# Patient Record
Sex: MALE | Race: WHITE
[De-identification: names, ages, dates, MRNs, and addresses within clinical notes are randomized per-mention and may not be internally consistent; named-entity substitution may affect disease eponyms.]

---

## 2020-10-05 ENCOUNTER — HOSPITAL ENCOUNTER (EMERGENCY)
Dept: HOSPITAL 61 - ER | Age: 70
LOS: 1 days | Discharge: ANOTHER HOSPITAL | End: 2020-10-06
Payer: MEDICARE

## 2020-10-05 VITALS — WEIGHT: 225.53 LBS | BODY MASS INDEX: 32.29 KG/M2 | HEIGHT: 70 IN

## 2020-10-05 DIAGNOSIS — R47.01: ICD-10-CM

## 2020-10-05 DIAGNOSIS — Z98.890: ICD-10-CM

## 2020-10-05 DIAGNOSIS — Z88.8: ICD-10-CM

## 2020-10-05 DIAGNOSIS — E78.00: ICD-10-CM

## 2020-10-05 DIAGNOSIS — E11.9: ICD-10-CM

## 2020-10-05 DIAGNOSIS — I63.9: Primary | ICD-10-CM

## 2020-10-05 DIAGNOSIS — I25.10: ICD-10-CM

## 2020-10-05 LAB
ALBUMIN SERPL-MCNC: 3.3 G/DL (ref 3.4–5)
ALBUMIN/GLOB SERPL: 1 {RATIO} (ref 1–1.7)
ALP SERPL-CCNC: 102 U/L (ref 46–116)
ALT SERPL-CCNC: 26 U/L (ref 16–63)
ANION GAP SERPL CALC-SCNC: 4 MMOL/L (ref 6–14)
APTT BLD: 25 SEC (ref 24–38)
AST SERPL-CCNC: 24 U/L (ref 15–37)
BASOPHILS # BLD AUTO: 0 X10^3/UL (ref 0–0.2)
BASOPHILS NFR BLD: 0 % (ref 0–3)
BILIRUB SERPL-MCNC: 1.3 MG/DL (ref 0.2–1)
BUN SERPL-MCNC: 23 MG/DL (ref 8–26)
BUN/CREAT SERPL: 13 (ref 6–20)
CALCIUM SERPL-MCNC: 9.4 MG/DL (ref 8.5–10.1)
CHLORIDE SERPL-SCNC: 105 MMOL/L (ref 98–107)
CO2 SERPL-SCNC: 28 MMOL/L (ref 21–32)
CREAT SERPL-MCNC: 1.8 MG/DL (ref 0.7–1.3)
EOSINOPHIL NFR BLD: 0.3 X10^3/UL (ref 0–0.7)
EOSINOPHIL NFR BLD: 3 % (ref 0–3)
ERYTHROCYTE [DISTWIDTH] IN BLOOD BY AUTOMATED COUNT: 14.4 % (ref 11.5–14.5)
GFR SERPLBLD BASED ON 1.73 SQ M-ARVRAT: 37.4 ML/MIN
GLUCOSE SERPL-MCNC: 193 MG/DL (ref 70–99)
HCT VFR BLD CALC: 46.8 % (ref 39–53)
HGB BLD-MCNC: 15.7 G/DL (ref 13–17.5)
LYMPHOCYTES # BLD: 1.3 X10^3/UL (ref 1–4.8)
LYMPHOCYTES NFR BLD AUTO: 17 % (ref 24–48)
MAGNESIUM SERPL-MCNC: 2.4 MG/DL (ref 1.8–2.4)
MCH RBC QN AUTO: 30 PG (ref 25–35)
MCHC RBC AUTO-ENTMCNC: 34 G/DL (ref 31–37)
MCV RBC AUTO: 91 FL (ref 79–100)
MONO #: 0.8 X10^3/UL (ref 0–1.1)
MONOCYTES NFR BLD: 10 % (ref 0–9)
NEUT #: 5.4 X10^3/UL (ref 1.8–7.7)
NEUTROPHILS NFR BLD AUTO: 69 % (ref 31–73)
PLATELET # BLD AUTO: 128 X10^3/UL (ref 140–400)
POTASSIUM SERPL-SCNC: 3.7 MMOL/L (ref 3.5–5.1)
PROT SERPL-MCNC: 6.7 G/DL (ref 6.4–8.2)
PROTHROMBIN TIME: 12.7 SEC (ref 11.7–14)
RBC # BLD AUTO: 5.17 X10^6/UL (ref 4.3–5.7)
SODIUM SERPL-SCNC: 137 MMOL/L (ref 136–145)
WBC # BLD AUTO: 7.8 X10^3/UL (ref 4–11)

## 2020-10-05 PROCEDURE — 70450 CT HEAD/BRAIN W/O DYE: CPT

## 2020-10-05 PROCEDURE — 85025 COMPLETE CBC W/AUTO DIFF WBC: CPT

## 2020-10-05 PROCEDURE — 83735 ASSAY OF MAGNESIUM: CPT

## 2020-10-05 PROCEDURE — 70498 CT ANGIOGRAPHY NECK: CPT

## 2020-10-05 PROCEDURE — 85610 PROTHROMBIN TIME: CPT

## 2020-10-05 PROCEDURE — 84484 ASSAY OF TROPONIN QUANT: CPT

## 2020-10-05 PROCEDURE — 99291 CRITICAL CARE FIRST HOUR: CPT

## 2020-10-05 PROCEDURE — 93005 ELECTROCARDIOGRAM TRACING: CPT

## 2020-10-05 PROCEDURE — 36415 COLL VENOUS BLD VENIPUNCTURE: CPT

## 2020-10-05 PROCEDURE — 70496 CT ANGIOGRAPHY HEAD: CPT

## 2020-10-05 PROCEDURE — 80053 COMPREHEN METABOLIC PANEL: CPT

## 2020-10-05 PROCEDURE — 85730 THROMBOPLASTIN TIME PARTIAL: CPT

## 2020-10-05 NOTE — RAD
CT head without contrast

 

PQRS statement: CT scans at this facility use dose reduction including 

either automated exposure control, iterative reconstructions, and /or 

weight based radiation dosing via mA and kV modification when appropriate 

to reduce radiation dose to as low as reasonably achievable.

 

HISTORY: Slurred speech. Stroke.

 

FINDINGS: There is a wedge-shaped hypodense edema involving the cortex and

underlying white matter consistent with a right anterior cerebral artery 

territory acute ischemic infarct involving the right anterior parasagittal

frontal lobe. No hemorrhage, mass or hydrocephalus. Orbits, mastoids and 

bones are unremarkable.

 

IMPRESSION: Acute ischemic right anterior cerebral artery territory 

infarct of the right anterior parasagittal frontal lobe. No hemorrhage.

 

 

**********FOR INTERNAL CODING PURPOSES**********

 

Critical result:

 

Findings discussed with  Jo Mccall RN in the ED at 10/5/2020 11:09 

PM. Read back of report was performed. The nurse is giving the physician 

these results immediately.

 

RESULT CODE: (C)  

 

 

 

 

 

Electronically signed by: Morris Oviedo MD (10/5/2020 11:12 PM) 

[facility]

## 2020-10-06 VITALS — DIASTOLIC BLOOD PRESSURE: 55 MMHG | SYSTOLIC BLOOD PRESSURE: 118 MMHG

## 2020-10-06 NOTE — RAD
CT angiography head and neck with contrast

 

Stenosis calculations for CT, MR, and conventional angiography are based 

upon measurements of the distal ICA diameter in accordance with the NASCET

methodology. Stenosis calculations for carotid ultrasound studies are 

derived from validated velocity criteria which are known to correlate with

the NASCET methodology.

 

PQRS statement: CT scans at this facility use dose reduction including 

either automated exposure control, iterative reconstructions, and /or 

weight based radiation dosing via mA and kV modification when appropriate 

to reduce radiation dose to as low as reasonably achievable.

 

HISTORY: Stroke. Slurred speech.

 

TECHNIQUE: CT imaging of the head and neck with 3-D MIP reconstructions of

the arteries with 70 mL Omnipaque 300 intravenous contrast.

 

 

 

Neck findings: Calcified plaque aortic arch, no ostial stenosis of the 

vessels from the arch. Left vertebral artery is dominant. No plaque, 

stenosis, dissection, thrombus or occlusion the vertebral arteries.

 

Left carotid artery demonstrates minimal calcified plaque at the proximal 

cervical internal carotid artery without stenosis. There is a tortuous 

tonsillar loop of the vessel. No dissection, thrombus or occlusion.

 

Right carotid artery demonstrates mild soft plaque of the distal common 

carotid artery mild calcified plaque at the bifurcation and proximal 

internal carotid artery without significant stenosis with narrowing of no 

more than 10%. No dissection, thrombus or occlusion.

 

1.5 cm hypodense right thyroid nodule. Cervical disc osteophytes and 

uncovertebral spurs with spinal canal and neural foraminal stenoses.

 

Head findings: No significant stenosis, thrombus, occlusion or aneurysm of

the posterior circulation.

 

There is no thrombosis of the anterior cerebral arteries including the 

right anterior cerebral artery at the area of the hypodense ischemic 

infarct. There is right middle cerebral artery proximal M1 segment of 

abrupt focal occlusion with downstream reconstituted enhancement at its 

bifurcation presumably due to collaterals with filling of the M2 branch 

vessels and peripherally from collateral flow, this is age-indeterminate. 

The peripheral right middle cerebral arterial branches are smaller in 

caliber and less dense which may indicate some limited collateral flow.

 

IMPRESSION:

1. Large vessel occlusion of the right middle cerebral artery M1 segment, 

with reconstituted enhancement distally due to collateral flow as 

described above.

2. Age-indeterminate right anterior cerebral artery ischemic infarct of 

the right frontal lobe. No occlusion of the right anterior cerebral artery

evident.

 

 

**********FOR INTERNAL CODING PURPOSES**********

 

Critical result:

 

Findings discussed with  Dr. Vazquez at 10/6/2020 12:53 AM. Read back of 

report performed. The nurse is giving the treating physician these 

results.

 

RESULT CODE: (C)  

 

 

 

 

 

 

Electronically signed by: Morris Oviedo MD (10/6/2020 12:57 AM) 

[facility]

## 2020-10-06 NOTE — PHYS DOC
Past Medical History


Past Medical History:  CAD, Diabetes-Type II, High Cholesterol


Past Surgical History:  Coronary Bypass Surgery


Smoking Status:  Never Smoker


Alcohol Use:  None





General Adult


EDM:


Chief Complaint:  NEURO SYMPTOMS/DEFICITS





HPI:


HPI:


Patient is a 70 year old male who presents to the Emergency Room complaining of 

aphasia. Patient unable to provide a good history. Family states started this 

afternoon. He did have similar symptoms on Saturday that resolved.





Review of Systems:


Review of Systems:


General: Denies fever, chills, sweats, fatigue


Eyes: Denies drainage, blurred vision, eye redness


HENT: Denies rhinorrhea, sore throat, earache


Respiratory: Denies cough, shortness of breath, wheezing


Cardiac: Denies edema, palpitations, chest pain


GI: Denies abdominal pain, Nausea, vomiting


MSK: Denies back pain, neck pain


Skin: Denies rash, jaundice


Neuro: Denies headache, dizziness


Psychiatric: Denies SI/HI





Heart Score:


Risk Factors:


Risk Factors:  DM, Current or recent (<one month) smoker, HTN, HLP, family 

history of CAD, obesity.


Risk Scores:


Score 0 - 3:  2.5% MACE over next 6 weeks - Discharge Home


Score 4 - 6:  20.3% MACE over next 6 weeks - Admit for Clinical Observation


Score 7 - 10:  72.7% MACE over next 6 weeks - Early Invasive Strategies





Current Medications:





Current Medications








 Medications


  (Trade)  Dose


 Ordered  Sig/Jen  Start Time


 Stop Time Status Last Admin


Dose Admin


 


 Info


  (CONTRAST GIVEN


 -- Rx MONITORING)  1 each  PRN DAILY  PRN  10/6/20 00:00


 10/8/20 00:00   





 


 Iohexol


  (Omnipaque 300


 Mg/ml)  60 ml  1X  ONCE  10/6/20 00:00


 10/6/20 00:01 DC 10/6/20 00:26


60 ML











Allergies:


Allergies:





Allergies








Coded Allergies Type Severity Reaction Last Updated Verified


 


  Opioids - Morphine Analogues Allergy Unknown  10/5/20 Yes











Physical Exam:


PE:


General: Awake, alert, NAD. Well Nourished, well hydrated. Cooperative


HEENT: Atraumatic, EOMI, PERRL, airway patent, moist oral mucosa


Neck: Supple, trachea midline


Respiratory: CTA bilaterally, normal effort, no wheezing/crackles


CV: RRR, no murmur, cap refill <2


GI: Soft, nondistended, nontender, no masses


MSK: No obvious deformities


Skin: Warm, dry, intact


Neuro: Aphasia, 5/5 strength in BUE/BLE distally and proximally, CN 2-12 intact,

cerebellar testing normal, normal gait


Psych: Normal affect, normal mood, not suicidal or homicidal





Current Patient Data:


Labs:





                                Laboratory Tests








Test


 10/5/20


23:15


 


White Blood Count


 7.8 x10^3/uL


(4.0-11.0)


 


Red Blood Count


 5.17 x10^6/uL


(4.30-5.70)


 


Hemoglobin


 15.7 g/dL


(13.0-17.5)


 


Hematocrit


 46.8 %


(39.0-53.0)


 


Mean Corpuscular Volume


 91 fL ()





 


Mean Corpuscular Hemoglobin 30 pg (25-35)  


 


Mean Corpuscular Hemoglobin


Concent 34 g/dL


(31-37)


 


Red Cell Distribution Width


 14.4 %


(11.5-14.5)


 


Platelet Count


 128 x10^3/uL


(140-400)  L


 


Neutrophils (%) (Auto) 69 % (31-73)  


 


Lymphocytes (%) (Auto) 17 % (24-48)  L


 


Monocytes (%) (Auto) 10 % (0-9)  H


 


Eosinophils (%) (Auto) 3 % (0-3)  


 


Basophils (%) (Auto) 0 % (0-3)  


 


Neutrophils # (Auto)


 5.4 x10^3/uL


(1.8-7.7)


 


Lymphocytes # (Auto)


 1.3 x10^3/uL


(1.0-4.8)


 


Monocytes # (Auto)


 0.8 x10^3/uL


(0.0-1.1)


 


Eosinophils # (Auto)


 0.3 x10^3/uL


(0.0-0.7)


 


Basophils # (Auto)


 0.0 x10^3/uL


(0.0-0.2)


 


Prothrombin Time


 12.7 SEC


(11.7-14.0)


 


Prothrombin Time INR 1.0 (0.8-1.1)  


 


Activated Partial


Thromboplast Time 25 SEC (24-38)





 


Sodium Level


 137 mmol/L


(136-145)


 


Potassium Level


 3.7 mmol/L


(3.5-5.1)


 


Chloride Level


 105 mmol/L


()


 


Carbon Dioxide Level


 28 mmol/L


(21-32)


 


Anion Gap 4 (6-14)  L


 


Blood Urea Nitrogen


 23 mg/dL


(8-26)


 


Creatinine


 1.8 mg/dL


(0.7-1.3)  H


 


Estimated GFR


(Cockcroft-Gault) 37.4  





 


BUN/Creatinine Ratio 13 (6-20)  


 


Glucose Level


 193 mg/dL


(70-99)  H


 


Calcium Level


 9.4 mg/dL


(8.5-10.1)


 


Magnesium Level


 2.4 mg/dL


(1.8-2.4)


 


Total Bilirubin


 1.3 mg/dL


(0.2-1.0)  H


 


Aspartate Amino Transferase


(AST) 24 U/L (15-37)





 


Alanine Aminotransferase (ALT)


 26 U/L (16-63)





 


Alkaline Phosphatase


 102 U/L


()


 


Troponin I Quantitative


 < 0.017 ng/mL


(0.000-0.055)


 


Total Protein


 6.7 g/dL


(6.4-8.2)


 


Albumin


 3.3 g/dL


(3.4-5.0)  L


 


Albumin/Globulin Ratio 1.0 (1.0-1.7)  





                                Laboratory Tests


10/5/20 23:15








                                Laboratory Tests


10/5/20 23:15








Vital Signs:





                                   Vital Signs








  Date Time  Temp Pulse Resp B/P (MAP) Pulse Ox O2 Delivery O2 Flow Rate FiO2


 


10/5/20 23:30  66 18 112/66 (81) 95 Room Air  


 


10/5/20 22:40 98.4       





 98.4       











EKG:


EKG:


[]





Radiology/Procedures:


Radiology/Procedures:


[]





Course & Med Decision Making:


Course & Med Decision Making


Pertinent Labs and Imaging studies reviewed. (See chart for details)





Patient is a 70-year-old male who presents to the emergency room with aphasia.  

He is outside the window for TPA.  Code stroke was set off and patient was taken

 to CT which shows an acute stroke.  CT angios was done which shows a large 

vessel occlusion.  I have discussed the case with ESTEVAN who accepted.





Dragon Disclaimer:


Dragon Disclaimer:


This electronic medical record was generated, in whole or in part, using a voice

 recognition dictation system.





Departure


Departure


Impression:  


   Primary Impression:  


   Stroke


   Additional Impression:  


   Aphasia


Disposition:  02 TRANSFER T-[facility]


Condition:  STABLE


Referrals:  


YVONNE OCAMPO MD (PCP)


Critical Care Time





Critical Care:


Authorized and Performed by: Clare Vazquez MD


Total critical care time: approximately 45 minutes


Due to a high probability of clinically significant, life threatening 

deterioration, the patient required my highest level of preparedness to 

intervene emergently and I personally spent this critical care time directly and

 personally managing the patient. This critical care time included obtaining a 

history; examining the patient; pulse oximetry; ventilator management if 

necessary; ordering and review of studies; arranging urgent treatment with 

development of a management plan; evaluation of patient's response to treatment;

 frequent reassessment; discussion with patient/family; and, discussions with 

other providers.


This critical care time was performed to assess and manage the high probability 

of imminent, life-threatening deterioration that could result in multi-organ 

failure. It was exclusive of separately billable procedures and treating other 

patients and teaching time.


Please see MDM section and the rest of the note for further information on 

patient assessment and treatment.











CLARE VAZQUEZ MD               Oct 6, 2020 01:24

## 2020-10-07 NOTE — EKG
[facility]

              [address] [place] [address]

Test Date:    2020-10-05               Test Time:    23:20:04

Pat Name:     KAMILAH GALINDO             Department:   

Patient ID:   [id number]           Room:          

Gender:       M                        Technician:   

:          [id number]               Requested By: CLARE AHUMADA

Order Number: [id number]           Reading MD:     

                                 Measurements

Intervals                              Axis          

Rate:         68                       P:            0

WA:           84                       QRS:          18

QRSD:         150                      T:            -12

QT:           462                                    

QTc:          497                                    

                           Interpretive Statements

SINUS RHYTHM

NON SPECIFIC INTRAVENTRICULAR BLOCK

QRS(T) CONTOUR ABNORMALITY

CONSISTENT WITH ANTEROLATERAL INFARCT

AGE UNDETERMINED

CONSISTENT WITH INFERIOR INFARCT

AGE UNDETERMINED

ABNORMAL ECG

RI6.01

No previous ECG available for comparison